# Patient Record
Sex: FEMALE | Race: BLACK OR AFRICAN AMERICAN | NOT HISPANIC OR LATINO
[De-identification: names, ages, dates, MRNs, and addresses within clinical notes are randomized per-mention and may not be internally consistent; named-entity substitution may affect disease eponyms.]

---

## 2017-06-26 ENCOUNTER — RECORDS - HEALTHEAST (OUTPATIENT)
Dept: ADMINISTRATIVE | Facility: OTHER | Age: 32
End: 2017-06-26

## 2017-09-03 ENCOUNTER — COMMUNICATION - HEALTHEAST (OUTPATIENT)
Dept: SCHEDULING | Facility: CLINIC | Age: 32
End: 2017-09-03

## 2017-10-26 ENCOUNTER — OFFICE VISIT - HEALTHEAST (OUTPATIENT)
Dept: MIDWIFE SERVICES | Facility: CLINIC | Age: 32
End: 2017-10-26

## 2017-10-26 DIAGNOSIS — N92.3 INTERMENSTRUAL BLEEDING: ICD-10-CM

## 2017-10-26 DIAGNOSIS — E28.2 PCOS (POLYCYSTIC OVARIAN SYNDROME): ICD-10-CM

## 2017-10-26 DIAGNOSIS — F41.9 ANXIETY: ICD-10-CM

## 2017-10-26 ASSESSMENT — MIFFLIN-ST. JEOR: SCORE: 1756.62

## 2018-03-23 ENCOUNTER — COMMUNICATION - HEALTHEAST (OUTPATIENT)
Dept: FAMILY MEDICINE | Facility: CLINIC | Age: 33
End: 2018-03-23

## 2018-03-23 ENCOUNTER — COMMUNICATION - HEALTHEAST (OUTPATIENT)
Dept: MIDWIFE SERVICES | Facility: CLINIC | Age: 33
End: 2018-03-23

## 2018-03-23 DIAGNOSIS — Z30.41 ORAL CONTRACEPTIVE USE: ICD-10-CM

## 2018-04-13 ENCOUNTER — OFFICE VISIT - HEALTHEAST (OUTPATIENT)
Dept: FAMILY MEDICINE | Facility: CLINIC | Age: 33
End: 2018-04-13

## 2018-04-13 DIAGNOSIS — N89.8 VAGINAL ITCHING: ICD-10-CM

## 2018-04-13 DIAGNOSIS — R30.0 DYSURIA: ICD-10-CM

## 2018-04-13 LAB
ALBUMIN UR-MCNC: ABNORMAL MG/DL
AMORPH CRY #/AREA URNS HPF: ABNORMAL /[HPF]
APPEARANCE UR: CLEAR
BACTERIA #/AREA URNS HPF: ABNORMAL HPF
BILIRUB UR QL STRIP: ABNORMAL
CLUE CELLS: NORMAL
COLOR UR AUTO: YELLOW
GLUCOSE UR STRIP-MCNC: NEGATIVE MG/DL
HGB UR QL STRIP: NEGATIVE
KETONES UR STRIP-MCNC: ABNORMAL MG/DL
LEUKOCYTE ESTERASE UR QL STRIP: ABNORMAL
MUCOUS THREADS #/AREA URNS LPF: ABNORMAL LPF
NITRATE UR QL: NEGATIVE
PH UR STRIP: 6 [PH] (ref 5–8)
RBC #/AREA URNS AUTO: ABNORMAL HPF
SP GR UR STRIP: >=1.03 (ref 1–1.03)
SQUAMOUS #/AREA URNS AUTO: >100 LPF
TRICHOMONAS, WET PREP: NORMAL
UROBILINOGEN UR STRIP-ACNC: ABNORMAL
WBC #/AREA URNS AUTO: ABNORMAL HPF
YEAST, WET PREP: NORMAL

## 2018-04-13 RX ORDER — NAPROXEN 500 MG/1
500 TABLET ORAL
Status: SHIPPED | COMMUNITY
Start: 2018-02-28

## 2018-04-13 ASSESSMENT — MIFFLIN-ST. JEOR: SCORE: 1689.94

## 2018-04-14 LAB — BACTERIA SPEC CULT: NO GROWTH

## 2018-04-16 ENCOUNTER — COMMUNICATION - HEALTHEAST (OUTPATIENT)
Dept: FAMILY MEDICINE | Facility: CLINIC | Age: 33
End: 2018-04-16

## 2018-04-16 LAB
C TRACH DNA SPEC QL PROBE+SIG AMP: NEGATIVE
N GONORRHOEA DNA SPEC QL NAA+PROBE: NEGATIVE
SPECIMEN INTEGRITY: NORMAL

## 2021-05-30 ENCOUNTER — RECORDS - HEALTHEAST (OUTPATIENT)
Dept: ADMINISTRATIVE | Facility: CLINIC | Age: 36
End: 2021-05-30

## 2021-05-31 ENCOUNTER — RECORDS - HEALTHEAST (OUTPATIENT)
Dept: ADMINISTRATIVE | Facility: CLINIC | Age: 36
End: 2021-05-31

## 2021-05-31 VITALS — WEIGHT: 231 LBS | BODY MASS INDEX: 37.12 KG/M2 | HEIGHT: 66 IN

## 2021-06-01 ENCOUNTER — RECORDS - HEALTHEAST (OUTPATIENT)
Dept: ADMINISTRATIVE | Facility: CLINIC | Age: 36
End: 2021-06-01

## 2021-06-01 VITALS — WEIGHT: 216.3 LBS | HEIGHT: 66 IN | BODY MASS INDEX: 34.76 KG/M2

## 2021-06-13 NOTE — PROGRESS NOTES
Subjective:     Alicia GARCIA Junior is a 32 y.o. female who presents for birth control follow-up. Current contraception method: Depo-Provera injections.Sexually transmitted infection risk: very low risk of STD exposure. LMP unsure but thinks sometime in August 2017. Menstrual flow: constant x 2.5 mo. Breastfeeding no. Has used COCP (ortho tricyclen) for contraception in the past, but caused increased bleeding and anemia.  She also has inconsistently use condoms.  Pertinent past medical history: current smoker, has migraines but without aura.  She had the same partner from 2010 until February of this year.  She began dating a new partner in July 2017.  She is unsure of his sexual history and does except Chlamydia/gonorrhea testing today.  She was recently started on metformin for PCOS.  She reports that her glucose levels have been normal.  She has had a fairly recent weight loss of 65 pounds.  She was applauded for these efforts and encouraged to continue with her diet and exercise changes.    1.  Continued bleeding on Depo Provera:  Had first Depo shot 6/26/17. Began bleeding early to mid August and has continued to bleed daily with an irregular pattern since then.  Reports some days are heavier and red and some days are lighter spotting and brownish discharge.  Some days have both.  She has also felt dizzy and fatigued.  Had US yesterday through [facility] to assess for anatomical reason for bleeding.  Endometrium was 9mm without any evidence of fibroid or polyp.  States she has a family hx in her mother of heavy vaginal bleeding resulting in hysterectomy.  She does not want to have continued bleeding and is interested in intervention to stop this.  Her PCP referred her to OB but was unable to get in soon so came to see CNM.  We reviewed that continued bleeding was not uncommon after initiation of Depo-Provera, however if she would like to proceed we can prescribe either an estrogen progesterone patch, NuvaRing, or  "combination OCPs to control bleeding.  We discussed that this might regulate her bodies reaction to Depo-Provera and would be hopeful that after 3 months of birth control pills, she could discontinue them and the Depo-Provera may work better.  We also discussed that she could use a combined hormonal birth control on its own as well, however it does not have the advantage of having only one shot every 3 months.  At this time she would like to continue getting Depo-Provera injections every 3 months, and try a course of continuous combined oral contraceptive pills for 3 months and then assess what her bleeding pattern is once they are discontinued.    2.  Anxiety: Alicia filled out a PHQ 9 form, and the score was 16.  She reports that she has had anxiety and panic attacks since 2014.  She has not gone to counseling or therapy for this and has not taking any medication.  She does not have symptoms of depression, and has not wanted to take any medications that are antidepressants.  She may be open to anxiety medication, however does not want to be \"too calm\".  When she has the panic attacks which she reports as \"fairly often\", she reports having tachycardia, extreme worrying and feelings of panic.  I encouraged her to seek therapy if this was interfering with her daily life while working or within her family life.  We also discussed nonmedicated ways to decrease anxiety including, essential oils, progressive relaxation, and deep breathing techniques.  Also reviewed that increasing exercise as a way to help reduce anxiety.    3.  Anemia: Alicia reports a history of anemia beginning when she had heavy bleeding after the initiation of Ortho Tri-Cyclen for birth control.  She also reports that her mother and sister have anemia as well.  She denies any family history of sickle cell anemia or other blood dyscrasia.  I did review the notes from yesterday's visit with her primary care provider, where they did recommend an iron " supplement daily.  I reiterated this recommendation and gave instructions on how to best take the iron.  Her iron was last checked in September and was 9.7.  We will check it again today.    Menstrual History:  OB History      Para Term  AB Living    1 1  1  1    SAB TAB Ectopic Multiple Live Births        1         Menarche age: 15  LMP: 2017, unsure exact date       The following portions of the patient's history were reviewed and updated as appropriate: allergies, current medications, past family history, past medical history, past social history, past surgical history and problem list.    Review of Systems  A 12 point comprehensive review of systems was negative except as noted.    Review of Systems - History obtained from chart review and the patient  General ROS: negative  Psychological ROS: positive for - anxiety and panic attacks  Hematological and Lymphatic ROS: positive for - anemia  Endocrine ROS: positive for - PCOS  Breast ROS: negative  Respiratory ROS: negative  Cardiovascular ROS: negative  Gastrointestinal ROS: negative  Genito-Urinary ROS: positive for - intermenstrual bleeding since month after receiving first depo shot, PCOS  Musculoskeletal ROS: negative  Neurological ROS: positive for - headaches usually 4 days out of the week.  Usually takes OTC headache relief  Dermatological ROS: positive for hirsutism     Past Medical History:   Diagnosis Date     Abnormal Pap smear of cervix          Anemia      Anxiety      Female infertility      Hypertension     hx preeclampsia 2012     Migraine     migraines     Polycystic ovary syndrome      Past Surgical History:   Procedure Laterality Date      SECTION, LOW TRANSVERSE       COLPOSCOPY      2012     WISDOM TOOTH EXTRACTION       Social History     Social History     Marital status: Single     Spouse name: N/A     Number of children: N/A     Years of education: N/A     Occupational History     Not on file.     Social  History Main Topics     Smoking status: Current Some Day Smoker     Smokeless tobacco: Never Used      Comment: 1 pack per 1-2 weeks     Alcohol use Yes      Comment: occasionally     Drug use: No     Sexual activity: Yes     Partners: Male     Birth control/ protection: Injection     Other Topics Concern     Not on file     Social History Narrative     No narrative on file     Family History   Problem Relation Age of Onset     Anemia Mother      Diabetes Father      Anemia Sister      Asthma Brother      Asthma Daughter      Hypertension Maternal Grandmother      Cancer Paternal Grandmother      Early death Paternal Grandmother      No Medical Problems Paternal Grandfather      No Medical Problems Brother         Objective:   No exam.      No exam performed today, not indicated.        Assessment:    32 y.o., continuing Depo-Provera injections, no contraindications.    Intermenstrual bleeding  Birth control follow-up    Plan:    Chlamydia specimen.  Contraception: OCP (estrogen/progesterone).  Addition of Gladys x 3 months to see if this would decrease and regulate her bleeding  Follow up: No Follow-up on file.  GC specimen.  Continue Depo-Provera injections every 3 months: Next due December 2017    -Labs GC/CT, Hgb  -Rx written for Gladys x 90 days continuous.   -Return in 4-6 months for reevaluation if bleeding pattern no better    TT with patient 40 mns, >50% time spent in counseling or coordination of care.     Zuly Hong CNM

## 2021-06-16 PROBLEM — N92.3 INTERMENSTRUAL BLEEDING: Status: ACTIVE | Noted: 2017-10-26

## 2021-06-16 PROBLEM — F41.9 ANXIETY: Status: ACTIVE | Noted: 2017-10-26

## 2021-06-16 PROBLEM — E28.2 PCOS (POLYCYSTIC OVARIAN SYNDROME): Status: ACTIVE | Noted: 2017-10-26

## 2021-06-17 NOTE — PROGRESS NOTES
Assessment / Impression     1. Dysuria      Urinalysis    Urine,Microscopic    Culture, Urine    CANCELED: Urinalysis-UC if Indicated   2. Vaginal itching  Wet Prep, Vaginal    Chlamydia trachomatis & Neisseria gonorrhoeae, Amplified Detection         Plan:     I reviewed today's UA results with patient.  The micro and urine culture is still pending.  Given UA does not show obvious UTI at this time, would recommend we wait until we get micro and urine culture, if it is positive, would recommend treatment with antibiotics.  Because patient is also having vaginal symptoms, wet prep as well as gonorrhea and Chlamydia symptoms were collected.  In the meantime, discussed adequate hygiene, avoidance of fragrance soaps, may use topical Vagisil if needed.  If all testing is normal and symptoms persist, return to clinic for reevaluation next week, sooner if symptoms worsen.  Patient understands, agrees with plan.    Subjective:      HPI: Alicia GARCIA Junior is a 32 y.o. female, new to me, who hasn't been seen at [facility] medicine in over 3 years, who presents for possible urinary or vaginal symptoms.  Yesterday afternoon, patient noted a burning sensation with peeing.  Today, she felt like it was more vaginal symptoms, including vaginal itching.  No vaginal discharge.  Did not know any gross hematuria.  No increase in urinary frequency.  No CVA or suprapubic tenderness.  No nausea or vomiting.  No fevers.  She is currently sexually active, does not use condoms consistently.      Medical History:     Patient Active Problem List   Diagnosis     Anemia Secondary To Blood Loss     Abnormal Pap Smear Of Cervix     Obesity     Bell's Palsy     Foot Pain (Soft Tissue)     Anxiety     Intermenstrual bleeding     PCOS (polycystic ovarian syndrome)       Past Medical History:   Diagnosis Date     Abnormal Pap smear of cervix     2012     Anemia      Anxiety      Female infertility      Hypertension     hx preeclampsia 2012      "Migraine     migraines     Polycystic ovary syndrome        Past Surgical History:   Procedure Laterality Date      SECTION, LOW TRANSVERSE       COLPOSCOPY      2012     WISDOM TOOTH EXTRACTION         Current Medications:     Current Outpatient Prescriptions   Medication Sig Note     cholecalciferol, vitamin D3, 1,000 unit capsule Take 1,000 Units by mouth. 2018: Received from: [facility] Received Sig: Take 1 capsule by mouth once daily.     fluticasone (FLONASE) 50 mcg/actuation nasal spray 1 spray into each nostril. 2018: Received from: [facility] Received Sig: Inhale 1 Spray in the nostril(s) once daily.     metFORMIN (GLUCOPHAGE) 500 MG tablet Take 500 mg by mouth 2 (two) times a day. 10/26/2017: Received from: [facility] Received Sig: Take 1 tablet by mouth 2 times daily with meals.     naproxen (NAPROSYN) 500 MG tablet Take 500 mg by mouth. 2018: Received from: [facility] Received Sig: Take 1 tablet by mouth 2 times daily with meals.       Family History:     Family History   Problem Relation Age of Onset     Anemia Mother      Diabetes Father      Anemia Sister      Asthma Brother      Asthma Daughter      Hypertension Maternal Grandmother      Cancer Paternal Grandmother      Early death Paternal Grandmother      No Medical Problems Paternal Grandfather      No Medical Problems Brother        Review of Systems  All other systems reviewed and are negative.         Social History:     History   Smoking Status     Current Some Day Smoker   Smokeless Tobacco     Never Used     Comment: 1 pack per 1-2 weeks     Social History     Social History Narrative    Not currently working.           Objective:     /68 (Patient Site: Left Arm, Patient Position: Sitting, Cuff Size: Adult Large)  Pulse 76  Resp 16  Ht 5' 5.5\" (1.664 m)  Wt 216 lb 4.8 oz " (98.1 kg)  LMP 01/12/2018 (Approximate)  Breastfeeding? No  BMI 35.45 kg/m2  Physical Examination: General appearance - alert, well appearing, and in no distress  Eyes: pupils equal and reactive, extraocular eye movements intact  Mouth: mucous membranes moist, pharynx normal without lesions  Neck: supple, no significant adenopathy or thyromegaly  Lungs: clear to auscultation, no wheezes, rales or rhonchi, symmetric air entry  Heart: normal rate, regular rhythm, normal S1, S2, no murmurs.  Abdomen: soft, nontender, nondistended, no masses or organomegaly.  No suprapubic tenderness.  Musculoskeletal: No CVA tenderness bilaterally.  : Normal external female genitalia.  No lesions noted.  Normal-appearing vaginal kang.  Cervix appears normal.  There is mild amount of white discharge noted.  Neurological: alert, oriented, normal speech, no focal findings or movement disorder noted.    Extremities: No edema, no clubbing or cyanosis  Psychiatric: Normal affect. Does not appear anxious or depressed.    Recent Results (from the past 168 hour(s))   Urinalysis   Result Value Ref Range    Color, UA Yellow Colorless, Yellow, Straw, Light Yellow    Clarity, UA Clear Clear    Glucose, UA Negative Negative    Bilirubin, UA Small (!) Negative    Ketones, UA Trace (!) Negative    Specific Gravity, UA >=1.030 1.005 - 1.030    Blood, UA Negative Negative    pH, UA 6.0 5.0 - 8.0    Protein, UA 30 mg/dL (!) Negative mg/dL    Urobilinogen, UA 1.0 E.U./dL 0.2 E.U./dL, 1.0 E.U./dL    Nitrite, UA Negative Negative    Leukocytes, UA Small (!) Negative         Meghan Swift MD  4/13/2018  3:34 PM

## 2021-08-21 ENCOUNTER — HEALTH MAINTENANCE LETTER (OUTPATIENT)
Age: 36
End: 2021-08-21

## 2021-10-16 ENCOUNTER — HEALTH MAINTENANCE LETTER (OUTPATIENT)
Age: 36
End: 2021-10-16

## 2022-10-01 ENCOUNTER — HEALTH MAINTENANCE LETTER (OUTPATIENT)
Age: 37
End: 2022-10-01